# Patient Record
Sex: MALE | Race: WHITE | NOT HISPANIC OR LATINO
[De-identification: names, ages, dates, MRNs, and addresses within clinical notes are randomized per-mention and may not be internally consistent; named-entity substitution may affect disease eponyms.]

---

## 2019-06-27 ENCOUNTER — TRANSCRIPTION ENCOUNTER (OUTPATIENT)
Age: 64
End: 2019-06-27

## 2019-10-18 PROBLEM — Z00.00 ENCOUNTER FOR PREVENTIVE HEALTH EXAMINATION: Status: ACTIVE | Noted: 2019-10-18

## 2019-11-01 ENCOUNTER — APPOINTMENT (OUTPATIENT)
Dept: COLORECTAL SURGERY | Facility: CLINIC | Age: 64
End: 2019-11-01
Payer: MEDICARE

## 2019-11-01 VITALS
HEART RATE: 91 BPM | WEIGHT: 203.13 LBS | BODY MASS INDEX: 30.09 KG/M2 | SYSTOLIC BLOOD PRESSURE: 103 MMHG | TEMPERATURE: 98.3 F | DIASTOLIC BLOOD PRESSURE: 65 MMHG | HEIGHT: 69 IN

## 2019-11-01 DIAGNOSIS — D3A.090 BENIGN CARCINOID TUMOR OF THE BRONCHUS AND LUNG: ICD-10-CM

## 2019-11-01 DIAGNOSIS — K57.32 DIVERTICULITIS OF LARGE INTESTINE W/OUT PERFORATION OR ABSCESS W/OUT BLEEDING: ICD-10-CM

## 2019-11-01 DIAGNOSIS — Z86.39 PERSONAL HISTORY OF OTHER ENDOCRINE, NUTRITIONAL AND METABOLIC DISEASE: ICD-10-CM

## 2019-11-01 DIAGNOSIS — Z86.79 PERSONAL HISTORY OF OTHER DISEASES OF THE CIRCULATORY SYSTEM: ICD-10-CM

## 2019-11-01 DIAGNOSIS — J86.9 PYOTHORAX W/OUT FISTULA: ICD-10-CM

## 2019-11-01 DIAGNOSIS — Z80.7 FAMILY HISTORY OF OTHER MALIGNANT NEOPLASMS OF LYMPHOID, HEMATOPOIETIC AND RELATED TISSUES: ICD-10-CM

## 2019-11-01 PROCEDURE — 99215 OFFICE O/P EST HI 40 MIN: CPT

## 2019-11-01 RX ORDER — OLMESARTAN MEDOXOMIL / AMLODIPINE BESYLATE / HYDROCHLOROTHIAZIDE 10; 25; 40 MG/1; MG/1; MG/1
TABLET, FILM COATED ORAL
Refills: 0 | Status: ACTIVE | COMMUNITY

## 2019-11-01 RX ORDER — ATORVASTATIN CALCIUM 10 MG/1
10 TABLET, FILM COATED ORAL
Refills: 0 | Status: ACTIVE | COMMUNITY

## 2019-11-01 NOTE — PHYSICAL EXAM
[Abdomen Masses] : No abdominal masses [Abdomen Tenderness] : ~T No ~M abdominal tenderness [Tender] : nontender [Enlarged] : not enlarged [Exam Deferred] : exam was deferred [Excoriation] : no perianal excoriation [JVD] : no jugular venous distention  [Thyroid] : the thyroid was abnormal [Carotid Bruits] : no carotid bruits [Normal Breath Sounds] : Normal breath sounds [Normal Heart Sounds] : normal heart sounds [2+] : left 2+ [Alert] : alert [Oriented to Person] : oriented to person [Oriented to Place] : oriented to place [Oriented to Time] : oriented to time [Calm] : calm [de-identified] : nad

## 2019-11-01 NOTE — ASSESSMENT
[FreeTextEntry1] : More than 7-8 attacks (fever, Left sided pain).  CT documentation for both mid to distal descending colon and proximal to mid sigmoid attacks.  Had colonoscopy fairly recently.  NO complex attacks.\par Leaning toward having resection done.  \par Risks and benefits and alternative (observation) discussed.  Risk of infection (abscess, leak, wound infection, etc), bleeding (? need for transfusion), and sexual or bladder dysfunction discussed and understood).  \par Drawings, and all relevant web site packets given (L colectomy, diverituclitis, laparoscopy, etc).\par Medical clearance for his HTN requested.\par Will consider recommendations.

## 2019-11-01 NOTE — HISTORY OF PRESENT ILLNESS
[FreeTextEntry1] : 64 year old male with history of diverticulitis. He was seen by us in April of 2019  and told to have CT if more attacks occurred.  Has had  total of 7 or more diverticulits attacks . First attack was in 1991, no CT scan.  Was hospitalized in 2004 on IV antibiotics.    Most recent prior to September was in February 2019. \par In February he ws  treated for an attack and CT +. Colonoscopy was done in April by Ria. \par September he had an attack and was treated with oral antibiotics.  CT found non complicated mild diverticulitis.  Feels slight discomfort now.  BM's are regular with metamucil. \par \par CT from September seen (images) and showed clear mid descending colon fat stranding and swelling c/w diveritculitis.\par Also looked at 2 scans from before 2010. Both showed clearcut diverticulitis of mid to proximal sigmoid colon (same area on each early scan).

## 2019-11-10 ENCOUNTER — TRANSCRIPTION ENCOUNTER (OUTPATIENT)
Age: 64
End: 2019-11-10

## 2019-11-18 ENCOUNTER — TRANSCRIPTION ENCOUNTER (OUTPATIENT)
Age: 64
End: 2019-11-18

## 2019-12-23 RX ORDER — METRONIDAZOLE 500 MG/1
500 TABLET ORAL
Qty: 6 | Refills: 0 | Status: ACTIVE | COMMUNITY
Start: 2019-12-23 | End: 1900-01-01

## 2019-12-23 RX ORDER — POLYETHYLENE GLYCOL 3350, SODIUM CHLORIDE, SODIUM BICARBONATE AND POTASSIUM CHLORIDE WITH LEMON FLAVOR 420; 11.2; 5.72; 1.48 G/4L; G/4L; G/4L; G/4L
420 POWDER, FOR SOLUTION ORAL
Qty: 1 | Refills: 0 | Status: ACTIVE | COMMUNITY
Start: 2019-12-23 | End: 1900-01-01

## 2020-01-06 VITALS
WEIGHT: 199.3 LBS | OXYGEN SATURATION: 99 % | DIASTOLIC BLOOD PRESSURE: 68 MMHG | HEART RATE: 70 BPM | HEIGHT: 69.5 IN | SYSTOLIC BLOOD PRESSURE: 106 MMHG | TEMPERATURE: 98 F | RESPIRATION RATE: 16 BRPM

## 2020-01-07 ENCOUNTER — RESULT REVIEW (OUTPATIENT)
Age: 65
End: 2020-01-07

## 2020-01-07 ENCOUNTER — INPATIENT (INPATIENT)
Facility: HOSPITAL | Age: 65
LOS: 4 days | Discharge: HOME | DRG: 349 | End: 2020-01-12
Attending: COLON & RECTAL SURGERY | Admitting: COLON & RECTAL SURGERY
Payer: MEDICARE

## 2020-01-07 ENCOUNTER — APPOINTMENT (OUTPATIENT)
Dept: COLORECTAL SURGERY | Facility: HOSPITAL | Age: 65
End: 2020-01-07

## 2020-01-07 DIAGNOSIS — Z98.49 CATARACT EXTRACTION STATUS, UNSPECIFIED EYE: Chronic | ICD-10-CM

## 2020-01-07 DIAGNOSIS — Z90.49 ACQUIRED ABSENCE OF OTHER SPECIFIED PARTS OF DIGESTIVE TRACT: Chronic | ICD-10-CM

## 2020-01-07 DIAGNOSIS — Z98.890 OTHER SPECIFIED POSTPROCEDURAL STATES: Chronic | ICD-10-CM

## 2020-01-07 LAB
ANION GAP SERPL CALC-SCNC: 14 MMOL/L — SIGNIFICANT CHANGE UP (ref 5–17)
BLD GP AB SCN SERPL QL: NEGATIVE — SIGNIFICANT CHANGE UP
BUN SERPL-MCNC: 15 MG/DL — SIGNIFICANT CHANGE UP (ref 7–23)
CALCIUM SERPL-MCNC: 8.8 MG/DL — SIGNIFICANT CHANGE UP (ref 8.4–10.5)
CHLORIDE SERPL-SCNC: 101 MMOL/L — SIGNIFICANT CHANGE UP (ref 96–108)
CO2 SERPL-SCNC: 24 MMOL/L — SIGNIFICANT CHANGE UP (ref 22–31)
CREAT SERPL-MCNC: 1.16 MG/DL — SIGNIFICANT CHANGE UP (ref 0.5–1.3)
GLUCOSE BLDC GLUCOMTR-MCNC: 101 MG/DL — HIGH (ref 70–99)
GLUCOSE BLDC GLUCOMTR-MCNC: 136 MG/DL — HIGH (ref 70–99)
GLUCOSE SERPL-MCNC: 144 MG/DL — HIGH (ref 70–99)
HCT VFR BLD CALC: 36.6 % — LOW (ref 39–50)
HGB BLD-MCNC: 12.2 G/DL — LOW (ref 13–17)
MAGNESIUM SERPL-MCNC: 1.7 MG/DL — SIGNIFICANT CHANGE UP (ref 1.6–2.6)
MCHC RBC-ENTMCNC: 28.4 PG — SIGNIFICANT CHANGE UP (ref 27–34)
MCHC RBC-ENTMCNC: 33.3 GM/DL — SIGNIFICANT CHANGE UP (ref 32–36)
MCV RBC AUTO: 85.1 FL — SIGNIFICANT CHANGE UP (ref 80–100)
NRBC # BLD: 0 /100 WBCS — SIGNIFICANT CHANGE UP (ref 0–0)
PHOSPHATE SERPL-MCNC: 4.2 MG/DL — SIGNIFICANT CHANGE UP (ref 2.5–4.5)
PLATELET # BLD AUTO: 143 K/UL — LOW (ref 150–400)
POTASSIUM SERPL-MCNC: 4.1 MMOL/L — SIGNIFICANT CHANGE UP (ref 3.5–5.3)
POTASSIUM SERPL-SCNC: 4.1 MMOL/L — SIGNIFICANT CHANGE UP (ref 3.5–5.3)
RBC # BLD: 4.3 M/UL — SIGNIFICANT CHANGE UP (ref 4.2–5.8)
RBC # FLD: 15.7 % — HIGH (ref 10.3–14.5)
RH IG SCN BLD-IMP: POSITIVE — SIGNIFICANT CHANGE UP
SODIUM SERPL-SCNC: 139 MMOL/L — SIGNIFICANT CHANGE UP (ref 135–145)
WBC # BLD: 7.69 K/UL — SIGNIFICANT CHANGE UP (ref 3.8–10.5)
WBC # FLD AUTO: 7.69 K/UL — SIGNIFICANT CHANGE UP (ref 3.8–10.5)

## 2020-01-07 PROCEDURE — 88307 TISSUE EXAM BY PATHOLOGIST: CPT | Mod: 26

## 2020-01-07 PROCEDURE — 44204 LAPARO PARTIAL COLECTOMY: CPT

## 2020-01-07 PROCEDURE — 44213 LAP MOBIL SPLENIC FL ADD-ON: CPT

## 2020-01-07 RX ORDER — HEPARIN SODIUM 5000 [USP'U]/ML
5000 INJECTION INTRAVENOUS; SUBCUTANEOUS ONCE
Refills: 0 | Status: COMPLETED | OUTPATIENT
Start: 2020-01-07 | End: 2020-01-07

## 2020-01-07 RX ORDER — GABAPENTIN 400 MG/1
600 CAPSULE ORAL ONCE
Refills: 0 | Status: COMPLETED | OUTPATIENT
Start: 2020-01-07 | End: 2020-01-07

## 2020-01-07 RX ORDER — ACETAMINOPHEN 500 MG
1000 TABLET ORAL ONCE
Refills: 0 | Status: COMPLETED | OUTPATIENT
Start: 2020-01-08 | End: 2020-01-08

## 2020-01-07 RX ORDER — KETOROLAC TROMETHAMINE 30 MG/ML
15 SYRINGE (ML) INJECTION EVERY 6 HOURS
Refills: 0 | Status: DISCONTINUED | OUTPATIENT
Start: 2020-01-07 | End: 2020-01-08

## 2020-01-07 RX ORDER — HYDROMORPHONE HYDROCHLORIDE 2 MG/ML
0.5 INJECTION INTRAMUSCULAR; INTRAVENOUS; SUBCUTANEOUS EVERY 4 HOURS
Refills: 0 | Status: DISCONTINUED | OUTPATIENT
Start: 2020-01-07 | End: 2020-01-08

## 2020-01-07 RX ORDER — HEPARIN SODIUM 5000 [USP'U]/ML
5000 INJECTION INTRAVENOUS; SUBCUTANEOUS EVERY 8 HOURS
Refills: 0 | Status: DISCONTINUED | OUTPATIENT
Start: 2020-01-07 | End: 2020-01-12

## 2020-01-07 RX ORDER — ACETAMINOPHEN 500 MG
1000 TABLET ORAL ONCE
Refills: 0 | Status: COMPLETED | OUTPATIENT
Start: 2020-01-07 | End: 2020-01-07

## 2020-01-07 RX ORDER — ATORVASTATIN CALCIUM 80 MG/1
1 TABLET, FILM COATED ORAL
Qty: 0 | Refills: 0 | DISCHARGE

## 2020-01-07 RX ORDER — CHOLECALCIFEROL (VITAMIN D3) 125 MCG
1 CAPSULE ORAL
Qty: 0 | Refills: 0 | DISCHARGE

## 2020-01-07 RX ORDER — MAGNESIUM SULFATE 500 MG/ML
1 VIAL (ML) INJECTION ONCE
Refills: 0 | Status: COMPLETED | OUTPATIENT
Start: 2020-01-07 | End: 2020-01-07

## 2020-01-07 RX ORDER — SODIUM CHLORIDE 9 MG/ML
500 INJECTION INTRAMUSCULAR; INTRAVENOUS; SUBCUTANEOUS ONCE
Refills: 0 | Status: COMPLETED | OUTPATIENT
Start: 2020-01-07 | End: 2020-01-07

## 2020-01-07 RX ORDER — SODIUM CHLORIDE 9 MG/ML
1000 INJECTION, SOLUTION INTRAVENOUS
Refills: 0 | Status: DISCONTINUED | OUTPATIENT
Start: 2020-01-07 | End: 2020-01-08

## 2020-01-07 RX ORDER — ONDANSETRON 8 MG/1
4 TABLET, FILM COATED ORAL EVERY 6 HOURS
Refills: 0 | Status: DISCONTINUED | OUTPATIENT
Start: 2020-01-07 | End: 2020-01-12

## 2020-01-07 RX ORDER — ASPIRIN/CALCIUM CARB/MAGNESIUM 324 MG
1 TABLET ORAL
Qty: 0 | Refills: 0 | DISCHARGE

## 2020-01-07 RX ORDER — OLMESARTAN MEDOXOMIL / AMLODIPINE BESYLATE / HYDROCHLOROTHIAZIDE 40; 10; 25 MG/1; MG/1; MG/1
1 TABLET, FILM COATED ORAL
Qty: 0 | Refills: 0 | DISCHARGE

## 2020-01-07 RX ADMIN — SODIUM CHLORIDE 125 MILLILITER(S): 9 INJECTION, SOLUTION INTRAVENOUS at 15:49

## 2020-01-07 RX ADMIN — Medication 1000 MILLIGRAM(S): at 16:45

## 2020-01-07 RX ADMIN — Medication 1 DROP(S): at 22:42

## 2020-01-07 RX ADMIN — Medication 400 MILLIGRAM(S): at 22:00

## 2020-01-07 RX ADMIN — HYDROMORPHONE HYDROCHLORIDE 0.5 MILLIGRAM(S): 2 INJECTION INTRAMUSCULAR; INTRAVENOUS; SUBCUTANEOUS at 16:49

## 2020-01-07 RX ADMIN — SODIUM CHLORIDE 1000 MILLILITER(S): 9 INJECTION INTRAMUSCULAR; INTRAVENOUS; SUBCUTANEOUS at 23:15

## 2020-01-07 RX ADMIN — HYDROMORPHONE HYDROCHLORIDE 0.5 MILLIGRAM(S): 2 INJECTION INTRAMUSCULAR; INTRAVENOUS; SUBCUTANEOUS at 16:40

## 2020-01-07 RX ADMIN — Medication 15 MILLIGRAM(S): at 22:00

## 2020-01-07 RX ADMIN — HEPARIN SODIUM 5000 UNIT(S): 5000 INJECTION INTRAVENOUS; SUBCUTANEOUS at 08:50

## 2020-01-07 RX ADMIN — GABAPENTIN 600 MILLIGRAM(S): 400 CAPSULE ORAL at 08:50

## 2020-01-07 RX ADMIN — Medication 1000 MILLIGRAM(S): at 08:50

## 2020-01-07 RX ADMIN — Medication 15 MILLIGRAM(S): at 22:29

## 2020-01-07 RX ADMIN — HEPARIN SODIUM 5000 UNIT(S): 5000 INJECTION INTRAVENOUS; SUBCUTANEOUS at 22:00

## 2020-01-07 RX ADMIN — SODIUM CHLORIDE 125 MILLILITER(S): 9 INJECTION, SOLUTION INTRAVENOUS at 22:01

## 2020-01-07 RX ADMIN — Medication 1000 MILLIGRAM(S): at 22:29

## 2020-01-07 RX ADMIN — Medication 400 MILLIGRAM(S): at 16:03

## 2020-01-07 RX ADMIN — Medication 100 GRAM(S): at 22:42

## 2020-01-07 RX ADMIN — Medication 15 MILLIGRAM(S): at 16:05

## 2020-01-07 RX ADMIN — Medication 15 MILLIGRAM(S): at 15:59

## 2020-01-07 NOTE — BRIEF OPERATIVE NOTE - OPERATION/FINDINGS
Extent of disease appeared to be mid-descending colon to proximal sigmoid, with sparing of the mid- and distal sigmoid. The affected colon was mobilized as was the splenic flexure. The left ureter was identified and preserved. A stapled ,extracorporeal resection was performed and a side to side colon anastomosis was created.

## 2020-01-07 NOTE — H&P ADULT - NSICDXPASTSURGICALHX_GEN_ALL_CORE_FT
PAST SURGICAL HISTORY:  H/O inguinal hernia repair     H/O umbilical hernia repair     History of appendectomy     History of appendectomy     History of cataract surgery bilateral    History of lung surgery right, empyema removed, 2015    History of lung surgery left upper lobe, 2004

## 2020-01-07 NOTE — BRIEF OPERATIVE NOTE - NSICDXBRIEFPROCEDURE_GEN_ALL_CORE_FT
PROCEDURES:  Mobilization, splenic flexure 07-Jan-2020 16:01:31  Lisa Ortez  Sigmoidectomy, laparoscopic, with laparotomy if indicated 07-Jan-2020 16:01:18  Lisa Ortez

## 2020-01-07 NOTE — H&P ADULT - NSICDXPASTMEDICALHX_GEN_ALL_CORE_FT
PAST MEDICAL HISTORY:  Acid reflux     Carcinoid tumor left lung    Diverticulitis     HLD (hyperlipidemia)     HTN (hypertension)

## 2020-01-07 NOTE — H&P ADULT - HISTORY OF PRESENT ILLNESS
Patient is a 64 year old male with long history of numerous episodes of diverticulitis since 1991, last in September 2019. Based on imaging, disease was noted in the descending and proximal sigmoid colon. He now presents electively for laparoscopic sigmoidectomy.  He also has KEE on CPAP, and history of carcinoid tumor resected from left lung. No complaints at present.

## 2020-01-07 NOTE — H&P ADULT - ASSESSMENT
64M with long history of numerous episodes of diverticulitis presents electively for laparoscopic sigmoidectomy.   - Admit to surgery, telemetry bed  - OR for above procedure  - CPAP post-op  - ERAS/Colon bundle

## 2020-01-07 NOTE — PROGRESS NOTE ADULT - SUBJECTIVE AND OBJECTIVE BOX
POST-OPERATIVE NOTE    Procedure: Laparoscopic Sigmoidectomy    Diagnosis/Indication: Diverticulitis     Surgeon: Dr. Alonzo    S: Pt has no complaints. Denies CP, SOB, FLOREZ, calf tenderness. Pain controlled with medication. Denies nausea, vomiting.    O:  T(C): 37.2 (01-07-20 @ 18:10), Max: 37.2 (01-07-20 @ 18:10)  T(F): 99 (01-07-20 @ 18:10), Max: 99 (01-07-20 @ 18:10)  HR: 90 (01-07-20 @ 20:20) (83 - 90)  BP: 135/66 (01-07-20 @ 20:20) (125/68 - 135/66)  RR: 16 (01-07-20 @ 20:20) (16 - 16)  SpO2: 95% (01-07-20 @ 20:20) (95% - 100%)  Wt(kg): --                        12.2   7.69  )-----------( 143      ( 07 Jan 2020 20:24 )             36.6     01-07    139  |  101  |  15  ----------------------------<  144<H>  4.1   |  24  |  1.16    Ca    8.8      07 Jan 2020 20:24  Phos  4.2     01-07  Mg     1.7     01-07    Gen: NAD, resting comfortably in bed  C/V: NSR  Pulm: Nonlabored breathing, no respiratory distress  Abd: soft, NT/ND, incision areas are clean, dry and intact  Extrem: WWP, no calf edema or tenderness, SCDs in place    A/P: 64y Male s/p above procedure  Diet: NPO  IVF: LR @ 125cc/hr  Pain/nausea control  SQH/SCDs/OOBA/IS  Dispo pending pain control, PO tolerance, clinical improvement

## 2020-01-08 LAB
ANION GAP SERPL CALC-SCNC: 12 MMOL/L — SIGNIFICANT CHANGE UP (ref 5–17)
BUN SERPL-MCNC: 16 MG/DL — SIGNIFICANT CHANGE UP (ref 7–23)
CALCIUM SERPL-MCNC: 8.3 MG/DL — LOW (ref 8.4–10.5)
CHLORIDE SERPL-SCNC: 105 MMOL/L — SIGNIFICANT CHANGE UP (ref 96–108)
CO2 SERPL-SCNC: 22 MMOL/L — SIGNIFICANT CHANGE UP (ref 22–31)
CREAT SERPL-MCNC: 1.17 MG/DL — SIGNIFICANT CHANGE UP (ref 0.5–1.3)
GLUCOSE SERPL-MCNC: 123 MG/DL — HIGH (ref 70–99)
HCT VFR BLD CALC: 32.2 % — LOW (ref 39–50)
HCV AB S/CO SERPL IA: 0.08 S/CO — SIGNIFICANT CHANGE UP
HCV AB SERPL-IMP: SIGNIFICANT CHANGE UP
HGB BLD-MCNC: 10.7 G/DL — LOW (ref 13–17)
MAGNESIUM SERPL-MCNC: 2 MG/DL — SIGNIFICANT CHANGE UP (ref 1.6–2.6)
MCHC RBC-ENTMCNC: 28.2 PG — SIGNIFICANT CHANGE UP (ref 27–34)
MCHC RBC-ENTMCNC: 33.2 GM/DL — SIGNIFICANT CHANGE UP (ref 32–36)
MCV RBC AUTO: 84.7 FL — SIGNIFICANT CHANGE UP (ref 80–100)
NRBC # BLD: 0 /100 WBCS — SIGNIFICANT CHANGE UP (ref 0–0)
PHOSPHATE SERPL-MCNC: 4.3 MG/DL — SIGNIFICANT CHANGE UP (ref 2.5–4.5)
PLATELET # BLD AUTO: 132 K/UL — LOW (ref 150–400)
POTASSIUM SERPL-MCNC: 4.2 MMOL/L — SIGNIFICANT CHANGE UP (ref 3.5–5.3)
POTASSIUM SERPL-SCNC: 4.2 MMOL/L — SIGNIFICANT CHANGE UP (ref 3.5–5.3)
RBC # BLD: 3.8 M/UL — LOW (ref 4.2–5.8)
RBC # FLD: 15.5 % — HIGH (ref 10.3–14.5)
SODIUM SERPL-SCNC: 139 MMOL/L — SIGNIFICANT CHANGE UP (ref 135–145)
WBC # BLD: 6.39 K/UL — SIGNIFICANT CHANGE UP (ref 3.8–10.5)
WBC # FLD AUTO: 6.39 K/UL — SIGNIFICANT CHANGE UP (ref 3.8–10.5)

## 2020-01-08 RX ORDER — ACETAMINOPHEN 500 MG
975 TABLET ORAL EVERY 6 HOURS
Refills: 0 | Status: COMPLETED | OUTPATIENT
Start: 2020-01-08 | End: 2020-01-10

## 2020-01-08 RX ORDER — HYDROMORPHONE HYDROCHLORIDE 2 MG/ML
0.5 INJECTION INTRAMUSCULAR; INTRAVENOUS; SUBCUTANEOUS EVERY 4 HOURS
Refills: 0 | Status: DISCONTINUED | OUTPATIENT
Start: 2020-01-08 | End: 2020-01-09

## 2020-01-08 RX ORDER — SODIUM CHLORIDE 9 MG/ML
500 INJECTION, SOLUTION INTRAVENOUS ONCE
Refills: 0 | Status: COMPLETED | OUTPATIENT
Start: 2020-01-08 | End: 2020-01-08

## 2020-01-08 RX ORDER — DEXTROSE MONOHYDRATE, SODIUM CHLORIDE, AND POTASSIUM CHLORIDE 50; .745; 4.5 G/1000ML; G/1000ML; G/1000ML
1000 INJECTION, SOLUTION INTRAVENOUS
Refills: 0 | Status: DISCONTINUED | OUTPATIENT
Start: 2020-01-08 | End: 2020-01-10

## 2020-01-08 RX ADMIN — Medication 975 MILLIGRAM(S): at 21:31

## 2020-01-08 RX ADMIN — HYDROMORPHONE HYDROCHLORIDE 0.5 MILLIGRAM(S): 2 INJECTION INTRAMUSCULAR; INTRAVENOUS; SUBCUTANEOUS at 18:27

## 2020-01-08 RX ADMIN — HEPARIN SODIUM 5000 UNIT(S): 5000 INJECTION INTRAVENOUS; SUBCUTANEOUS at 05:36

## 2020-01-08 RX ADMIN — HEPARIN SODIUM 5000 UNIT(S): 5000 INJECTION INTRAVENOUS; SUBCUTANEOUS at 21:31

## 2020-01-08 RX ADMIN — HYDROMORPHONE HYDROCHLORIDE 0.5 MILLIGRAM(S): 2 INJECTION INTRAMUSCULAR; INTRAVENOUS; SUBCUTANEOUS at 18:40

## 2020-01-08 RX ADMIN — Medication 15 MILLIGRAM(S): at 10:26

## 2020-01-08 RX ADMIN — Medication 400 MILLIGRAM(S): at 04:00

## 2020-01-08 RX ADMIN — Medication 15 MILLIGRAM(S): at 15:39

## 2020-01-08 RX ADMIN — Medication 1 DROP(S): at 05:36

## 2020-01-08 RX ADMIN — Medication 1 DROP(S): at 13:37

## 2020-01-08 RX ADMIN — SODIUM CHLORIDE 1000 MILLILITER(S): 9 INJECTION, SOLUTION INTRAVENOUS at 05:35

## 2020-01-08 RX ADMIN — HEPARIN SODIUM 5000 UNIT(S): 5000 INJECTION INTRAVENOUS; SUBCUTANEOUS at 13:37

## 2020-01-08 RX ADMIN — Medication 15 MILLIGRAM(S): at 11:12

## 2020-01-08 RX ADMIN — DEXTROSE MONOHYDRATE, SODIUM CHLORIDE, AND POTASSIUM CHLORIDE 100 MILLILITER(S): 50; .745; 4.5 INJECTION, SOLUTION INTRAVENOUS at 18:25

## 2020-01-08 RX ADMIN — Medication 15 MILLIGRAM(S): at 02:56

## 2020-01-08 RX ADMIN — Medication 1000 MILLIGRAM(S): at 05:00

## 2020-01-08 RX ADMIN — Medication 15 MILLIGRAM(S): at 15:40

## 2020-01-08 RX ADMIN — Medication 15 MILLIGRAM(S): at 03:34

## 2020-01-08 RX ADMIN — Medication 975 MILLIGRAM(S): at 22:10

## 2020-01-08 NOTE — PROGRESS NOTE ADULT - ASSESSMENT
A/P  Doing ok.  Rising creatinine noted.  d/c toradol.  Add 1000 mg tylenol po  Dilaudid for breakthrough pain.  D/c radha in am.  follow UO.  clears until has BM.  Ambulate more.

## 2020-01-08 NOTE — PROGRESS NOTE ADULT - ASSESSMENT
64M with long history of numerous episodes of diverticulitis presents electively for laparoscopic sigmoidectomy.     - NPO/IVF  - Pain/nausea control  - Post op labs  - Devries  - CPAP   - SCDs/SQH  - AM labs

## 2020-01-08 NOTE — PROGRESS NOTE ADULT - SUBJECTIVE AND OBJECTIVE BOX
STATUS POST:  POD 1 laparoscopic sigmoidectomy    SUBJECTIVE: Patient seen and examined bedside by chief resident and surgical team.  Patient received 1L bolus for low and concentrated urine. Improved urine o/p. Patient states he is passing urine and he is ambulating well.      heparin  Injectable 5000 Unit(s) SubCutaneous every 8 hours    Vital Signs Last 24 Hrs  T(C): 37.3 (08 Jan 2020 05:17), Max: 37.6 (07 Jan 2020 22:04)  T(F): 99.2 (08 Jan 2020 05:17), Max: 99.6 (07 Jan 2020 22:04)  HR: 68 (08 Jan 2020 04:04) (68 - 90)  BP: 104/59 (08 Jan 2020 04:04) (104/59 - 147/79)  BP(mean): 72 (08 Jan 2020 04:04) (72 - 106)  RR: 16 (08 Jan 2020 04:04) (14 - 18)  SpO2: 97% (08 Jan 2020 04:04) (95% - 100%)  I&O's Detail    07 Jan 2020 07:01  -  08 Jan 2020 07:00  --------------------------------------------------------  IN:    IV PiggyBack: 800 mL    lactated ringers.: 2500 mL  Total IN: 3300 mL    OUT:    Indwelling Catheter - Urethral: 775 mL  Total OUT: 775 mL    Total NET: 2525 mL    General: NAD, resting comfortably in bed  C/V: NSR  Pulm: Nonlabored breathing, no respiratory distress  Abd: soft, NT/ND, incision areas are clean, dry and intact  Extrem: WWP, no edema, SCDs in place    LABS:                        10.7   6.39  )-----------( 132      ( 08 Jan 2020 05:53 )             32.2     01-08    139  |  105  |  16  ----------------------------<  123<H>  4.2   |  22  |  1.17    Ca    8.3<L>      08 Jan 2020 05:54  Phos  4.3     01-08  Mg     2.0     01-08

## 2020-01-08 NOTE — PROGRESS NOTE ADULT - SUBJECTIVE AND OBJECTIVE BOX
POD 1  Passed flatus.  No BM  No N/V.  Took 1 dose dilaudid for pain.  Getting toradol and tylenol as well.  Devries remains.  Ambulated several times.  Tolerated clear liquids.    Vital Signs Last 24 Hrs  T(C): 36.9 (08 Jan 2020 15:24), Max: 37.6 (07 Jan 2020 22:04)  T(F): 98.4 (08 Jan 2020 15:24), Max: 99.6 (07 Jan 2020 22:04)  HR: 70 (08 Jan 2020 15:24) (68 - 90)  BP: 120/77 (08 Jan 2020 15:24) (104/59 - 135/66)  BP(mean): 80 (08 Jan 2020 09:13) (72 - 93)  RR: 18 (08 Jan 2020 15:24) (16 - 18)  SpO2: 96% (08 Jan 2020 15:24) (95% - 97%)    lungs clear   cor S1S2  abd: softly distended and obese, incision intact, BS+,   ext: without calf tenderness    UO   adequate                          10.7   6.39  )-----------( 132      ( 08 Jan 2020 05:53 )             32.2   01-08    139  |  105  |  16  ----------------------------<  123<H>  4.2   |  22  |  1.17    Ca    8.3<L>      08 Jan 2020 05:54  Phos  4.3     01-08  Mg     2.0     01-08

## 2020-01-09 ENCOUNTER — TRANSCRIPTION ENCOUNTER (OUTPATIENT)
Age: 65
End: 2020-01-09

## 2020-01-09 LAB
ANION GAP SERPL CALC-SCNC: 9 MMOL/L — SIGNIFICANT CHANGE UP (ref 5–17)
BUN SERPL-MCNC: 14 MG/DL — SIGNIFICANT CHANGE UP (ref 7–23)
CALCIUM SERPL-MCNC: 8.5 MG/DL — SIGNIFICANT CHANGE UP (ref 8.4–10.5)
CHLORIDE SERPL-SCNC: 107 MMOL/L — SIGNIFICANT CHANGE UP (ref 96–108)
CO2 SERPL-SCNC: 26 MMOL/L — SIGNIFICANT CHANGE UP (ref 22–31)
CREAT SERPL-MCNC: 1.16 MG/DL — SIGNIFICANT CHANGE UP (ref 0.5–1.3)
GLUCOSE SERPL-MCNC: 112 MG/DL — HIGH (ref 70–99)
HCT VFR BLD CALC: 31.9 % — LOW (ref 39–50)
HGB BLD-MCNC: 10 G/DL — LOW (ref 13–17)
MAGNESIUM SERPL-MCNC: 1.9 MG/DL — SIGNIFICANT CHANGE UP (ref 1.6–2.6)
MCHC RBC-ENTMCNC: 27.9 PG — SIGNIFICANT CHANGE UP (ref 27–34)
MCHC RBC-ENTMCNC: 31.3 GM/DL — LOW (ref 32–36)
MCV RBC AUTO: 89.1 FL — SIGNIFICANT CHANGE UP (ref 80–100)
NRBC # BLD: 0 /100 WBCS — SIGNIFICANT CHANGE UP (ref 0–0)
PHOSPHATE SERPL-MCNC: 2.5 MG/DL — SIGNIFICANT CHANGE UP (ref 2.5–4.5)
PLATELET # BLD AUTO: 108 K/UL — LOW (ref 150–400)
POTASSIUM SERPL-MCNC: 4.4 MMOL/L — SIGNIFICANT CHANGE UP (ref 3.5–5.3)
POTASSIUM SERPL-SCNC: 4.4 MMOL/L — SIGNIFICANT CHANGE UP (ref 3.5–5.3)
RBC # BLD: 3.58 M/UL — LOW (ref 4.2–5.8)
RBC # FLD: 15.8 % — HIGH (ref 10.3–14.5)
SODIUM SERPL-SCNC: 142 MMOL/L — SIGNIFICANT CHANGE UP (ref 135–145)
WBC # BLD: 4.26 K/UL — SIGNIFICANT CHANGE UP (ref 3.8–10.5)
WBC # FLD AUTO: 4.26 K/UL — SIGNIFICANT CHANGE UP (ref 3.8–10.5)

## 2020-01-09 PROCEDURE — 74019 RADEX ABDOMEN 2 VIEWS: CPT | Mod: 26

## 2020-01-09 RX ORDER — DIPHENHYDRAMINE HCL 50 MG
25 CAPSULE ORAL ONCE
Refills: 0 | Status: COMPLETED | OUTPATIENT
Start: 2020-01-09 | End: 2020-01-09

## 2020-01-09 RX ORDER — OXYCODONE HYDROCHLORIDE 5 MG/1
5 TABLET ORAL EVERY 6 HOURS
Refills: 0 | Status: DISCONTINUED | OUTPATIENT
Start: 2020-01-09 | End: 2020-01-12

## 2020-01-09 RX ORDER — MAGNESIUM SULFATE 500 MG/ML
1 VIAL (ML) INJECTION ONCE
Refills: 0 | Status: COMPLETED | OUTPATIENT
Start: 2020-01-09 | End: 2020-01-09

## 2020-01-09 RX ADMIN — Medication 100 GRAM(S): at 10:13

## 2020-01-09 RX ADMIN — Medication 975 MILLIGRAM(S): at 21:01

## 2020-01-09 RX ADMIN — Medication 975 MILLIGRAM(S): at 16:00

## 2020-01-09 RX ADMIN — Medication 975 MILLIGRAM(S): at 22:00

## 2020-01-09 RX ADMIN — Medication 25 MILLIGRAM(S): at 21:02

## 2020-01-09 RX ADMIN — Medication 975 MILLIGRAM(S): at 15:11

## 2020-01-09 RX ADMIN — DEXTROSE MONOHYDRATE, SODIUM CHLORIDE, AND POTASSIUM CHLORIDE 100 MILLILITER(S): 50; .745; 4.5 INJECTION, SOLUTION INTRAVENOUS at 16:19

## 2020-01-09 RX ADMIN — HEPARIN SODIUM 5000 UNIT(S): 5000 INJECTION INTRAVENOUS; SUBCUTANEOUS at 13:31

## 2020-01-09 RX ADMIN — Medication 975 MILLIGRAM(S): at 10:12

## 2020-01-09 RX ADMIN — Medication 975 MILLIGRAM(S): at 04:00

## 2020-01-09 RX ADMIN — HEPARIN SODIUM 5000 UNIT(S): 5000 INJECTION INTRAVENOUS; SUBCUTANEOUS at 21:01

## 2020-01-09 RX ADMIN — Medication 975 MILLIGRAM(S): at 03:08

## 2020-01-09 RX ADMIN — HYDROMORPHONE HYDROCHLORIDE 0.5 MILLIGRAM(S): 2 INJECTION INTRAMUSCULAR; INTRAVENOUS; SUBCUTANEOUS at 16:15

## 2020-01-09 RX ADMIN — HYDROMORPHONE HYDROCHLORIDE 0.5 MILLIGRAM(S): 2 INJECTION INTRAMUSCULAR; INTRAVENOUS; SUBCUTANEOUS at 16:30

## 2020-01-09 RX ADMIN — Medication 975 MILLIGRAM(S): at 09:12

## 2020-01-09 RX ADMIN — HEPARIN SODIUM 5000 UNIT(S): 5000 INJECTION INTRAVENOUS; SUBCUTANEOUS at 05:05

## 2020-01-09 NOTE — DISCHARGE NOTE PROVIDER - NSDCMRMEDTOKEN_GEN_ALL_CORE_FT
aspirin 81 mg oral delayed release tablet: 1 tab(s) orally once a day  atorvastatin 10 mg oral tablet: 1 tab(s) orally once a day  sildenafil 20 mg oral tablet: 1 tab(s) orally once a day, As Needed  Tribenzor 40 mg-5 mg-12.5 mg oral tablet: 1 tab(s) orally once a day  Vitamin D3 1000 intl units (25 mcg) oral tablet: 1 tab(s) orally once a day aspirin 81 mg oral delayed release tablet: 1 tab(s) orally once a day  atorvastatin 10 mg oral tablet: 1 tab(s) orally once a day  Colace 100 mg oral capsule: 1 cap(s) orally 2 times a day, As Needed -for constipation MDD:2   sildenafil 20 mg oral tablet: 1 tab(s) orally once a day, As Needed  Tribenzor 40 mg-5 mg-12.5 mg oral tablet: 1 tab(s) orally once a day  Vitamin D3 1000 intl units (25 mcg) oral tablet: 1 tab(s) orally once a day

## 2020-01-09 NOTE — PROGRESS NOTE ADULT - ASSESSMENT
64M with long history of numerous episodes of diverticulitis now s/p laparoscopic sigmoidectomy 1/7    - Adv to reg diet/IVF  - Pain/nausea control  - SCDs/SQH  - AM labs

## 2020-01-09 NOTE — DISCHARGE NOTE PROVIDER - HOSPITAL COURSE
Patient is a 64 year old male with long history of numerous episodes of diverticulitis since 1991, last in September 2019. Based on imaging, disease was noted in the descending and proximal sigmoid colon. He now presents electively for laparoscopic sigmoidectomy.  He also has KEE on CPAP, and history of carcinoid tumor resected from left lung. No complaints at present.         The patient was admitted to the general surgery service and on 1/7 underwent a laparoscopic sigmoidectomy that was uncomplicated. His postoperative course was unremarkable with advancement of diet, passing trial of void, and pain control. On day of discharge patient was stable to be d/c'd home. Patient is a 64 year old male with long history of numerous episodes of diverticulitis since 1991, last in September 2019. Based on imaging, disease was noted in the descending and proximal sigmoid colon. He now presents electively for laparoscopic sigmoidectomy.  He also has KEE on CPAP, and history of carcinoid tumor resected from left lung. No complaints at present.         The patient was admitted to the general surgery service and on 1/7 underwent a laparoscopic sigmoidectomy that was uncomplicated. His postoperative course was unremarkable with advancement of diet, passing trial of void, and pain control, voiding adequately, with return of bowel function. On day of discharge patient was stable to be d/c'd home.

## 2020-01-09 NOTE — PROGRESS NOTE ADULT - SUBJECTIVE AND OBJECTIVE BOX
24 hr events:  O/N: +Flatus/-BM. AFVSS. Jah CLD. Per Dr. Alonzo, gave standing tylenol x 2 d. d/c'd toradol for rising Cr (1.17).  1/8: adv to CLD, tolerating, UOP good. ambulating, +F/-BM. stepped down to regional.     SUBJECTIVE:  Pt seen and examined by chief resident. Pt is doing well, resting comfortably on bed. Pain controlled. Diet tolerated. Ambulating out of bed. +F/-BM. No nausea or vomiting. No complaints at this time.    Vital Signs Last 24 Hrs  T(C): 36.8 (09 Jan 2020 07:57), Max: 37.3 (08 Jan 2020 14:35)  T(F): 98.2 (09 Jan 2020 07:57), Max: 99.2 (08 Jan 2020 14:35)  HR: 71 (09 Jan 2020 07:57) (70 - 85)  BP: 141/83 (09 Jan 2020 07:57) (111/61 - 141/83)  BP(mean): 80 (08 Jan 2020 09:13) (80 - 80)  RR: 16 (09 Jan 2020 07:57) (16 - 18)  SpO2: 96% (09 Jan 2020 07:57) (96% - 97%)    Physical Exam:  General: NAD  Pulmonary: Nonlabored breathing, no respiratory distress  Cardiovascular: NSR  Abdominal: soft, mild distention, nontender, dressing over midline incision clean dry and intact.     Lines/drains/tubes:    I&O's Summary    08 Jan 2020 07:01  -  09 Jan 2020 07:00  --------------------------------------------------------  IN: 1450 mL / OUT: 1800 mL / NET: -350 mL        LABS:                        10.7   6.39  )-----------( 132      ( 08 Jan 2020 05:53 )             32.2     01-08    139  |  105  |  16  ----------------------------<  123<H>  4.2   |  22  |  1.17    Ca    8.3<L>      08 Jan 2020 05:54  Phos  4.3     01-08  Mg     2.0     01-08

## 2020-01-09 NOTE — DISCHARGE NOTE PROVIDER - NSDCFUADDINST_GEN_ALL_CORE_FT
Record urine volume each day, should be greater than 750cc/day. Please follow up with Dr. Alonzo next week. Call the office if you experience increasing pain, nausea, vomiting, swelling, redness, or leakage from stoma site, temperature >101.4F. Record urine volume each day, should be greater than 750cc/day. Please follow up with Dr. Alonzo next week. Call the office at [phone] to make an appointment. if you experience increasing pain, nausea, vomiting, swelling, redness, or leakage from stoma site, temperature >101.4F, please notify the surgical office.

## 2020-01-09 NOTE — DISCHARGE NOTE PROVIDER - INSTRUCTIONS
Low fiber diet Low fiber diet    Please continue a low fiber diet. Foods that are generally allowed on a low-fiber diet include:  White bread without nuts and seeds  White rice, plain white pasta, and crackers  Refined hot cereals, such as Cream of Wheat, or cold cereals with less than 1 gram of fiber per serving  Pancakes or waffles made from white refined flour  Most canned or well-cooked vegetables and fruits without skins or seeds  Fruit and vegetable juice with little or no pulp, fruit-flavored drinks, and flavored bruner  Tender meat, poultry, fish, eggs and tofu  Milk and foods made from milk — such as yogurt, pudding, ice cream, cheeses and sour cream — if tolerated  Butter, margarine, oils and salad dressings without seeds

## 2020-01-09 NOTE — DISCHARGE NOTE PROVIDER - CARE PROVIDER_API CALL
Kevin Alonzo)  ColonRectal Surgery  [address], [address]  [place], [place] [address]  Phone: [phone]  Follow Up Time:

## 2020-01-09 NOTE — PROGRESS NOTE ADULT - ASSESSMENT
A/P  Ileus with moderate soft distension.  Avoiding toradol due to elevated creatinine.  That has led to occasional dilaudid use which is not helping.  Doesn't need NG, thus far.  Will follow.  Minimize p/o clear fluids for now.  Ambulate  continue IV hydration, moderate rate.  Follow exam.

## 2020-01-09 NOTE — DISCHARGE NOTE PROVIDER - NSDCACTIVITY_GEN_ALL_CORE
Showering allowed/No heavy lifting/straining Walking - Outdoors allowed/No heavy lifting/straining/Stairs allowed/Walking - Indoors allowed/Showering allowed

## 2020-01-09 NOTE — PROGRESS NOTE ADULT - SUBJECTIVE AND OBJECTIVE BOX
POD 2  Passed small flatus this AM but none since.  No BM  Some mild nausea but no V.  Ambulated in hallway.  Getting po tylenol and prn dilaudid for pain.  Recently this PM took dilaudid  Had been taking clear liquids but has slowed down due to distension and N.  garcia out and has voided well since    Vital Signs Last 24 Hrs  T(C): 36.7 (09 Jan 2020 16:36), Max: 36.8 (09 Jan 2020 05:27)  T(F): 98 (09 Jan 2020 16:36), Max: 98.2 (09 Jan 2020 05:27)  HR: 68 (09 Jan 2020 16:36) (68 - 85)  BP: 130/78 (09 Jan 2020 16:36) (126/78 - 141/83)  BP(mean): --  RR: 18 (09 Jan 2020 16:36) (16 - 18)  SpO2: 96% (09 Jan 2020 16:36) (96% - 97%)  lungs: clear  cor: S1S2  abd: moderately distended but soft and non tender, BS very active normal pitched, incision intact, binder in place  ext: without calf tenderness     ml/last shift (last void volume was 250 ml)                          10.0   4.26  )-----------( 108      ( 09 Jan 2020 08:20 )             31.9   01-09    142  |  107  |  14  ----------------------------<  112<H>  4.4   |  26  |  1.16    Ca    8.5      09 Jan 2020 08:20  Phos  2.5     01-09  Mg     1.9     01-09

## 2020-01-09 NOTE — DISCHARGE NOTE PROVIDER - NSDCCPCAREPLAN_GEN_ALL_CORE_FT
PRINCIPAL DISCHARGE DIAGNOSIS  Diagnosis: Diverticulitis of colon  Assessment and Plan of Treatment: General Discharge Instructions:  Please resume all regular home medications unless specifically advised not to take a particular medication. Also, please take any new medications as prescribed.  Please get plenty of rest, continue to ambulate several times per day, and drink adequate amounts of fluids. Avoid lifting weights greater than 5-10 lbs until you follow-up with your surgeon, who will instruct you further regarding activity restrictions.  Avoid driving or operating heavy machinery while taking pain medications.  Please follow-up with your surgeon and Primary Care Provider (PCP) as advised.  Incision Care:  *Please call your doctor or nurse practitioner if you have increased pain, swelling, redness, or drainage from the incision site.  *Avoid swimming and baths until your follow-up appointment.  *You may shower, and wash surgical incisions with a mild soap and warm water. Gently pat the area dry.  *If you have staples, they will be removed at your follow-up appointment.  *If you have steri-strips, they will fall off on their own. Please remove any remaining strips 7-10 days after surgery.

## 2020-01-09 NOTE — DISCHARGE NOTE PROVIDER - NSDCCPTREATMENT_GEN_ALL_CORE_FT
PRINCIPAL PROCEDURE  Procedure: Sigmoidectomy, laparoscopic, with laparotomy if indicated  Findings and Treatment:       SECONDARY PROCEDURE  Procedure: Mobilization, splenic flexure  Findings and Treatment:

## 2020-01-10 LAB
ANION GAP SERPL CALC-SCNC: 10 MMOL/L — SIGNIFICANT CHANGE UP (ref 5–17)
BUN SERPL-MCNC: 9 MG/DL — SIGNIFICANT CHANGE UP (ref 7–23)
CALCIUM SERPL-MCNC: 8.4 MG/DL — SIGNIFICANT CHANGE UP (ref 8.4–10.5)
CHLORIDE SERPL-SCNC: 106 MMOL/L — SIGNIFICANT CHANGE UP (ref 96–108)
CO2 SERPL-SCNC: 24 MMOL/L — SIGNIFICANT CHANGE UP (ref 22–31)
CREAT SERPL-MCNC: 1.07 MG/DL — SIGNIFICANT CHANGE UP (ref 0.5–1.3)
GLUCOSE SERPL-MCNC: 108 MG/DL — HIGH (ref 70–99)
HCT VFR BLD CALC: 32.2 % — LOW (ref 39–50)
HGB BLD-MCNC: 10.3 G/DL — LOW (ref 13–17)
MAGNESIUM SERPL-MCNC: 2 MG/DL — SIGNIFICANT CHANGE UP (ref 1.6–2.6)
MCHC RBC-ENTMCNC: 28.1 PG — SIGNIFICANT CHANGE UP (ref 27–34)
MCHC RBC-ENTMCNC: 32 GM/DL — SIGNIFICANT CHANGE UP (ref 32–36)
MCV RBC AUTO: 87.7 FL — SIGNIFICANT CHANGE UP (ref 80–100)
NRBC # BLD: 0 /100 WBCS — SIGNIFICANT CHANGE UP (ref 0–0)
PHOSPHATE SERPL-MCNC: 3.6 MG/DL — SIGNIFICANT CHANGE UP (ref 2.5–4.5)
PLATELET # BLD AUTO: 110 K/UL — LOW (ref 150–400)
POTASSIUM SERPL-MCNC: 4.3 MMOL/L — SIGNIFICANT CHANGE UP (ref 3.5–5.3)
POTASSIUM SERPL-SCNC: 4.3 MMOL/L — SIGNIFICANT CHANGE UP (ref 3.5–5.3)
RBC # BLD: 3.67 M/UL — LOW (ref 4.2–5.8)
RBC # FLD: 15.5 % — HIGH (ref 10.3–14.5)
SODIUM SERPL-SCNC: 140 MMOL/L — SIGNIFICANT CHANGE UP (ref 135–145)
SURGICAL PATHOLOGY STUDY: SIGNIFICANT CHANGE UP
WBC # BLD: 4.5 K/UL — SIGNIFICANT CHANGE UP (ref 3.8–10.5)
WBC # FLD AUTO: 4.5 K/UL — SIGNIFICANT CHANGE UP (ref 3.8–10.5)

## 2020-01-10 RX ORDER — DIPHENHYDRAMINE HCL 50 MG
25 CAPSULE ORAL AT BEDTIME
Refills: 0 | Status: COMPLETED | OUTPATIENT
Start: 2020-01-10 | End: 2020-01-10

## 2020-01-10 RX ORDER — DEXTROSE MONOHYDRATE, SODIUM CHLORIDE, AND POTASSIUM CHLORIDE 50; .745; 4.5 G/1000ML; G/1000ML; G/1000ML
1000 INJECTION, SOLUTION INTRAVENOUS
Refills: 0 | Status: DISCONTINUED | OUTPATIENT
Start: 2020-01-10 | End: 2020-01-10

## 2020-01-10 RX ORDER — DEXTROSE MONOHYDRATE, SODIUM CHLORIDE, AND POTASSIUM CHLORIDE 50; .745; 4.5 G/1000ML; G/1000ML; G/1000ML
1000 INJECTION, SOLUTION INTRAVENOUS
Refills: 0 | Status: DISCONTINUED | OUTPATIENT
Start: 2020-01-10 | End: 2020-01-11

## 2020-01-10 RX ADMIN — Medication 975 MILLIGRAM(S): at 05:30

## 2020-01-10 RX ADMIN — Medication 975 MILLIGRAM(S): at 11:19

## 2020-01-10 RX ADMIN — Medication 975 MILLIGRAM(S): at 17:15

## 2020-01-10 RX ADMIN — Medication 975 MILLIGRAM(S): at 12:30

## 2020-01-10 RX ADMIN — HEPARIN SODIUM 5000 UNIT(S): 5000 INJECTION INTRAVENOUS; SUBCUTANEOUS at 22:00

## 2020-01-10 RX ADMIN — OXYCODONE HYDROCHLORIDE 5 MILLIGRAM(S): 5 TABLET ORAL at 21:23

## 2020-01-10 RX ADMIN — Medication 25 MILLIGRAM(S): at 21:22

## 2020-01-10 RX ADMIN — HEPARIN SODIUM 5000 UNIT(S): 5000 INJECTION INTRAVENOUS; SUBCUTANEOUS at 14:37

## 2020-01-10 RX ADMIN — Medication 975 MILLIGRAM(S): at 04:54

## 2020-01-10 RX ADMIN — OXYCODONE HYDROCHLORIDE 5 MILLIGRAM(S): 5 TABLET ORAL at 22:23

## 2020-01-10 RX ADMIN — HEPARIN SODIUM 5000 UNIT(S): 5000 INJECTION INTRAVENOUS; SUBCUTANEOUS at 05:00

## 2020-01-10 NOTE — DIETITIAN INITIAL EVALUATION ADULT. - ENERGY NEEDS
Height: 69.5" Weight: 199.4lbs, IBW 163lbs+/-10%, %.3%, BMI 29.0 kg/m2  IBW used for calculations as pt >120% of IBW. Needs adjusted for post-op, and advanced age.

## 2020-01-10 NOTE — PROGRESS NOTE ADULT - SUBJECTIVE AND OBJECTIVE BOX
POD 3  Passing flatus x 8-10 times since midnight but is distended.  No N or V.    Ambulating and voiding well.  Taking 1/3 small cup of clears / hour and chewing gum.  Shifiting positions (supine to L and R decubitis, then trendelenberg, the reverse T)  Vital Signs Last 24 Hrs  T(C): 36.8 (10 Hai 2020 16:07), Max: 37.2 (10 Hai 2020 05:23)  T(F): 98.3 (10 Hai 2020 16:07), Max: 99 (10 Hai 2020 05:23)  HR: 66 (10 Hai 2020 16:07) (66 - 83)  BP: 147/90 (10 Hai 2020 16:07) (131/70 - 147/90)  BP(mean): --  RR: 18 (10 Hai 2020 16:07) (17 - 18)  SpO2: 96% (10 Hai 2020 16:07) (95% - 99%)    lungs clear  cor: S1S2  abd: softly distended, BS + normal pitched, hyperactive, incision intact, penroses in place  digital: air in rectum (able to pass small amount with finger stenting the anus)  ext: without calf tenderness     ml/shift  IV had been 75 ml/hr but recently increased to 100 ml/hr                          10.3   4.50  )-----------( 110      ( 10 Hai 2020 08:28 )             32.2   01-10    140  |  106  |  9   ----------------------------<  108<H>  4.3   |  24  |  1.07    Ca    8.4      10 Hai 2020 08:28  Phos  3.6     01-10  Mg     2.0     01-10

## 2020-01-10 NOTE — PROGRESS NOTE ADULT - ASSESSMENT
64M with long history of numerous episodes of diverticulitis now s/p laparoscopic sigmoidectomy 1/7    - CLD/IVF  - Pain/nausea control  - OOBA  - SCDs/SQH  - AM labs  - Continue to monitor for return of bowel function

## 2020-01-10 NOTE — DIETITIAN INITIAL EVALUATION ADULT. - ADD RECOMMEND
1. CLD 2. Recommend advance to low fiber diet when medically feasible. 3. Recommend start bowel regime per persistent constipation s/p surgery 4. RD diet reenforcement prn

## 2020-01-10 NOTE — PROGRESS NOTE ADULT - ASSESSMENT
A/P  Doing ok but is distended and passing small amounts of flatus.  AXR shows air filled colon proximal to mosis.  Passing small amounts of flatus and is Afebrile with VSS and normal WBC and no tenderness.  When supine and flat abdomen not that distended and very soft.  Suspect edema at the anastomosis from surgery and excision of many large epiploicae to allow antimesenteric aspects of the bowel to meet.   Decrease IV fluids to 50 /hr (UO excellent).  Benadryl for sleep.    follow exam.  Allow limited clears.

## 2020-01-10 NOTE — DIETITIAN INITIAL EVALUATION ADULT. - OTHER INFO
64M with hx of carcinoid tumor resected from left lung and longstanding hx of recurrent diverticulitis admitting for elective sigmoidectomy (1/7), now POD #3. On assessment, pt resting in bed with wife at bedside. Pt diet was advanced to LFD 1/9, but c/o increased bloating and diet was changed back to CLD. Passed TOV. Currently on CLD tolerating PO denying N,V but c/o worsening distention limiting overall appetite. Education provided on diet progression, and low fiber diet guidelines and reasons for diet- pt and wife appear receptive to education. Educational handout left at bedside. Noted good appetite PTA, no changes in UBW. Follows regular diet. NKFA. GI: WDL, distention, +partial F per pt, awaiting BM. Skin: surgical incision, courtney score 20. Pain: none noted at this time. Please see nutrition recommendations below. RD to follow up per protocol.

## 2020-01-11 LAB
ANION GAP SERPL CALC-SCNC: 13 MMOL/L — SIGNIFICANT CHANGE UP (ref 5–17)
BUN SERPL-MCNC: 8 MG/DL — SIGNIFICANT CHANGE UP (ref 7–23)
CALCIUM SERPL-MCNC: 8.7 MG/DL — SIGNIFICANT CHANGE UP (ref 8.4–10.5)
CHLORIDE SERPL-SCNC: 106 MMOL/L — SIGNIFICANT CHANGE UP (ref 96–108)
CO2 SERPL-SCNC: 23 MMOL/L — SIGNIFICANT CHANGE UP (ref 22–31)
CREAT SERPL-MCNC: 1.01 MG/DL — SIGNIFICANT CHANGE UP (ref 0.5–1.3)
GLUCOSE SERPL-MCNC: 97 MG/DL — SIGNIFICANT CHANGE UP (ref 70–99)
MAGNESIUM SERPL-MCNC: 1.8 MG/DL — SIGNIFICANT CHANGE UP (ref 1.6–2.6)
PHOSPHATE SERPL-MCNC: 3.7 MG/DL — SIGNIFICANT CHANGE UP (ref 2.5–4.5)
POTASSIUM SERPL-MCNC: 4.2 MMOL/L — SIGNIFICANT CHANGE UP (ref 3.5–5.3)
POTASSIUM SERPL-SCNC: 4.2 MMOL/L — SIGNIFICANT CHANGE UP (ref 3.5–5.3)
SODIUM SERPL-SCNC: 142 MMOL/L — SIGNIFICANT CHANGE UP (ref 135–145)

## 2020-01-11 RX ORDER — ACETAMINOPHEN 500 MG
650 TABLET ORAL EVERY 6 HOURS
Refills: 0 | Status: DISCONTINUED | OUTPATIENT
Start: 2020-01-11 | End: 2020-01-12

## 2020-01-11 RX ORDER — DIPHENHYDRAMINE HCL 50 MG
25 CAPSULE ORAL ONCE
Refills: 0 | Status: COMPLETED | OUTPATIENT
Start: 2020-01-11 | End: 2020-01-11

## 2020-01-11 RX ORDER — MAGNESIUM SULFATE 500 MG/ML
1 VIAL (ML) INJECTION ONCE
Refills: 0 | Status: COMPLETED | OUTPATIENT
Start: 2020-01-11 | End: 2020-01-11

## 2020-01-11 RX ADMIN — Medication 100 GRAM(S): at 11:41

## 2020-01-11 RX ADMIN — HEPARIN SODIUM 5000 UNIT(S): 5000 INJECTION INTRAVENOUS; SUBCUTANEOUS at 21:44

## 2020-01-11 RX ADMIN — Medication 650 MILLIGRAM(S): at 21:44

## 2020-01-11 RX ADMIN — HEPARIN SODIUM 5000 UNIT(S): 5000 INJECTION INTRAVENOUS; SUBCUTANEOUS at 06:00

## 2020-01-11 RX ADMIN — HEPARIN SODIUM 5000 UNIT(S): 5000 INJECTION INTRAVENOUS; SUBCUTANEOUS at 14:51

## 2020-01-11 RX ADMIN — Medication 25 MILLIGRAM(S): at 21:44

## 2020-01-11 RX ADMIN — Medication 650 MILLIGRAM(S): at 22:50

## 2020-01-11 NOTE — PROGRESS NOTE ADULT - ASSESSMENT
A/P  Doing better.  Unlimited full liquids this AM.  IF tolerates then low residue diet for dinner.  If has increased distension then will back off po intake.  Increase ambulation and body position shifting.

## 2020-01-11 NOTE — PROGRESS NOTE ADULT - ASSESSMENT
64M with long history of numerous episodes of diverticulitis now s/p laparoscopic sigmoidectomy 1/7    - CLD/IVF  - Pain/nausea control  - SCDs/SQH  - AM labs

## 2020-01-11 NOTE — PROGRESS NOTE ADULT - SUBJECTIVE AND OBJECTIVE BOX
INTERVAL HPI/OVERNIGHT EVENTS: Overnight pt did not take in much PO. He did have two episodes of small BM's and is continuing to pass flatus. This AM pt feels he is less bloated. Denies any n/v/cp/sob    STATUS POST:  laparoscopic sigmoidectomy    POST OPERATIVE DAY #: 4    MEDICATIONS  (STANDING):  artificial tears (preservative free) Ophthalmic Solution 1 Drop(s) Both EYES three times a day  dextrose 5% + sodium chloride 0.45% with potassium chloride 20 mEq/L 1000 milliLiter(s) (50 mL/Hr) IV Continuous <Continuous>  heparin  Injectable 5000 Unit(s) SubCutaneous every 8 hours    MEDICATIONS  (PRN):  ondansetron Injectable 4 milliGRAM(s) IV Push every 6 hours PRN Nausea  oxyCODONE    IR 5 milliGRAM(s) Oral every 6 hours PRN Severe Pain (7 - 10)      Vital Signs Last 24 Hrs  T(C): 36.8 (11 Jan 2020 08:01), Max: 37.3 (11 Jan 2020 05:23)  T(F): 98.2 (11 Jan 2020 08:01), Max: 99.1 (11 Jan 2020 05:23)  HR: 66 (11 Jan 2020 08:01) (66 - 71)  BP: 136/87 (11 Jan 2020 08:01) (136/87 - 152/87)  BP(mean): --  RR: 16 (11 Jan 2020 08:01) (16 - 18)  SpO2: 95% (11 Jan 2020 08:01) (95% - 96%)    PHYSICAL EXAM:      Constitutional: A&Ox3    Respiratory: non labored breathing, no respiratory distress    Cardiovascular: NSR, RRR    Gastrointestinal: soft, non-tender, moderately distended                 Incision: c/d/i    Extremities: (-) edema                  I&O's Detail    10 Hai 2020 07:01  -  11 Jan 2020 07:00  --------------------------------------------------------  IN:    dextrose 5% + sodium chloride 0.45% with potassium chloride 20 mEq/L: 600 mL    Oral Fluid: 485 mL  Total IN: 1085 mL    OUT:    Voided: 1050 mL  Total OUT: 1050 mL    Total NET: 35 mL      11 Jan 2020 07:01  -  11 Jan 2020 09:54  --------------------------------------------------------  IN:    dextrose 5% + sodium chloride 0.45% with potassium chloride 20 mEq/L: 50 mL  Total IN: 50 mL    OUT:  Total OUT: 0 mL    Total NET: 50 mL          LABS:                        10.3   4.50  )-----------( 110      ( 10 Hai 2020 08:28 )             32.2     01-11    142  |  106  |  8   ----------------------------<  97  4.2   |  23  |  1.01    Ca    8.7      11 Jan 2020 07:25  Phos  3.7     01-11  Mg     1.8     01-11            RADIOLOGY & ADDITIONAL STUDIES:

## 2020-01-11 NOTE — PROGRESS NOTE ADULT - SUBJECTIVE AND OBJECTIVE BOX
POD 4  Passing little more flatus and also had  small liquid BM's.  Tolerating limited clear liquids.  Took oxycodone x 1 last PM.  None today, thus far.  Ambulating and shifting his body positions.  voiding ok.    Vital Signs Last 24 Hrs  T(C): 36.8 (11 Jan 2020 08:01), Max: 37.3 (11 Jan 2020 05:23)  T(F): 98.2 (11 Jan 2020 08:01), Max: 99.1 (11 Jan 2020 05:23)  HR: 66 (11 Jan 2020 08:01) (66 - 71)  BP: 136/87 (11 Jan 2020 08:01) (136/87 - 152/87)  BP(mean): --  RR: 16 (11 Jan 2020 08:01) (16 - 18)  SpO2: 95% (11 Jan 2020 08:01) (95% - 96%)    lungs clear  cor SS2  abd: softly distended, non tender, BS+, incision intact  ext: without calf tenderness	    UO: adequate    IV 50 ml/hr                          10.3   4.50  )-----------( 110      ( 10 Hai 2020 08:28 )             32.2   01-11    142  |  106  |  8   ----------------------------<  97  4.2   |  23  |  1.01    Ca    8.7      11 Jan 2020 07:25  Phos  3.7     01-11  Mg     1.8     01-11

## 2020-01-12 ENCOUNTER — TRANSCRIPTION ENCOUNTER (OUTPATIENT)
Age: 65
End: 2020-01-12

## 2020-01-12 VITALS
TEMPERATURE: 98 F | HEART RATE: 71 BPM | OXYGEN SATURATION: 97 % | RESPIRATION RATE: 16 BRPM | DIASTOLIC BLOOD PRESSURE: 87 MMHG | SYSTOLIC BLOOD PRESSURE: 156 MMHG

## 2020-01-12 LAB
ANION GAP SERPL CALC-SCNC: 12 MMOL/L — SIGNIFICANT CHANGE UP (ref 5–17)
BUN SERPL-MCNC: 8 MG/DL — SIGNIFICANT CHANGE UP (ref 7–23)
CALCIUM SERPL-MCNC: 8.9 MG/DL — SIGNIFICANT CHANGE UP (ref 8.4–10.5)
CHLORIDE SERPL-SCNC: 107 MMOL/L — SIGNIFICANT CHANGE UP (ref 96–108)
CO2 SERPL-SCNC: 24 MMOL/L — SIGNIFICANT CHANGE UP (ref 22–31)
CREAT SERPL-MCNC: 1.08 MG/DL — SIGNIFICANT CHANGE UP (ref 0.5–1.3)
GLUCOSE SERPL-MCNC: 101 MG/DL — HIGH (ref 70–99)
MAGNESIUM SERPL-MCNC: 2 MG/DL — SIGNIFICANT CHANGE UP (ref 1.6–2.6)
PHOSPHATE SERPL-MCNC: 4.2 MG/DL — SIGNIFICANT CHANGE UP (ref 2.5–4.5)
POTASSIUM SERPL-MCNC: 4.3 MMOL/L — SIGNIFICANT CHANGE UP (ref 3.5–5.3)
POTASSIUM SERPL-SCNC: 4.3 MMOL/L — SIGNIFICANT CHANGE UP (ref 3.5–5.3)
SODIUM SERPL-SCNC: 143 MMOL/L — SIGNIFICANT CHANGE UP (ref 135–145)

## 2020-01-12 RX ORDER — DOCUSATE SODIUM 100 MG
1 CAPSULE ORAL
Qty: 60 | Refills: 0
Start: 2020-01-12 | End: 2020-02-10

## 2020-01-12 RX ADMIN — HEPARIN SODIUM 5000 UNIT(S): 5000 INJECTION INTRAVENOUS; SUBCUTANEOUS at 05:57

## 2020-01-12 NOTE — PROGRESS NOTE ADULT - SUBJECTIVE AND OBJECTIVE BOX
STATUS POST:  POD # 5 s/p laparoscopic sigmoidectomy    INTERVAL HPI/OVERNIGHT EVENTS: Advanced to FLD , tolerating. Fluids dc'd     SUBJECTIVE: Examined with chief resident at the bedside, resting comfortably. This morning, he feels well; his pain is well-controlled. No nausea or vomiting. Passing flatus. No acute complaints.     heparin  Injectable 5000 Unit(s) SubCutaneous every 8 hours      Vital Signs Last 24 Hrs  T(C): 37.3 (12 Jan 2020 05:52), Max: 37.3 (12 Jan 2020 05:52)  T(F): 99.2 (12 Jan 2020 05:52), Max: 99.2 (12 Jan 2020 05:52)  HR: 64 (12 Jan 2020 05:52) (64 - 75)  BP: 146/83 (12 Jan 2020 05:52) (136/81 - 146/83)  BP(mean): 98 (11 Jan 2020 20:43) (98 - 98)  RR: 17 (12 Jan 2020 05:52) (17 - 18)  SpO2: 94% (12 Jan 2020 05:52) (94% - 96%)  I&O's Detail    11 Jan 2020 07:01  -  12 Jan 2020 07:00  --------------------------------------------------------  IN:    dextrose 5% + sodium chloride 0.45% with potassium chloride 20 mEq/L: 650 mL    Oral Fluid: 660 mL  Total IN: 1310 mL    OUT:    Voided: 1750 mL  Total OUT: 1750 mL    Total NET: -440 mL          Physical Exam:  General: NAD, resting comfortably in bed  Pulmonary: Nonlabored breathing, no respiratory distress  Cardiovascular: NSR  Abdominal: soft, non-tender, non-distended, midline incision c/d/i, Penrose removed,   Extremities: WWP, normal strength        LABS:                        10.3   4.50  )-----------( 110      ( 10 Hai 2020 08:28 )             32.2     01-12    143  |  107  |  8   ----------------------------<  101<H>  4.3   |  24  |  1.08    Ca    8.9      12 Jan 2020 06:27  Phos  4.2     01-12  Mg     2.0     01-12            RADIOLOGY & ADDITIONAL STUDIES:

## 2020-01-12 NOTE — DISCHARGE NOTE NURSING/CASE MANAGEMENT/SOCIAL WORK - NSPROEXTENSIONSOFSELF_GEN_A_NUR
eyeglasses/none
Pt. reports he lives with his wife and daughter in a house with 1 step to enter. Once inside, pt. reports he has a full flight of steps to negotiate to reach 2nd floor where main bedroom and bathroom are located. Per pt., he has a bathtub in his bathroom with shower chair available.

## 2020-01-12 NOTE — PROGRESS NOTE ADULT - ASSESSMENT
A/P doing well  low residue diet  d/c penrose drains  D/c home today  DPO f/u in 1 week  Call for problems

## 2020-01-12 NOTE — PROGRESS NOTE ADULT - ASSESSMENT
64M with long history of numerous episodes of diverticulitis now s/p laparoscopic sigmoidectomy 1/7. Clinically improving. Discharge.    - LRD/IVF  - Pain/nausea control  - SCDs/SQH  - AM labs  -Discharge

## 2020-01-12 NOTE — DISCHARGE NOTE NURSING/CASE MANAGEMENT/SOCIAL WORK - PATIENT PORTAL LINK FT
You can access the FollowMyHealth Patient Portal offered by [facility] by registering at the following website: [email or web address]. By joining [facility]’s FollowMyHealth portal, you will also be able to view your health information using other applications (apps) compatible with our system.

## 2020-01-12 NOTE — PROGRESS NOTE ADULT - REASON FOR ADMISSION
Diverticulitis

## 2020-01-12 NOTE — PROGRESS NOTE ADULT - SUBJECTIVE AND OBJECTIVE BOX
POD 5  + BM and flatus  Feels better  Full liquids yesterday  Ambulating  Wants d/c  Voiding well    Vital Signs Last 24 Hrs  T(C): 37.3 (12 Jan 2020 05:52), Max: 37.3 (12 Jan 2020 05:52)  T(F): 99.2 (12 Jan 2020 05:52), Max: 99.2 (12 Jan 2020 05:52)  HR: 64 (12 Jan 2020 05:52) (64 - 75)  BP: 146/83 (12 Jan 2020 05:52) (136/81 - 146/83)  BP(mean): 98 (11 Jan 2020 20:43) (98 - 98)  RR: 17 (12 Jan 2020 05:52) (16 - 18)  SpO2: 94% (12 Jan 2020 05:52) (94% - 96%)  lungs clear  cor S1S2  abd: benign, incision intact, minus d/c, non tender  ext: without calf tenderness    UO adequate

## 2020-01-13 PROBLEM — I10 ESSENTIAL (PRIMARY) HYPERTENSION: Chronic | Status: ACTIVE | Noted: 2020-01-06

## 2020-01-13 PROBLEM — K57.92 DIVERTICULITIS OF INTESTINE, PART UNSPECIFIED, WITHOUT PERFORATION OR ABSCESS WITHOUT BLEEDING: Chronic | Status: ACTIVE | Noted: 2020-01-06

## 2020-01-13 PROBLEM — K21.9 GASTRO-ESOPHAGEAL REFLUX DISEASE WITHOUT ESOPHAGITIS: Chronic | Status: ACTIVE | Noted: 2020-01-06

## 2020-01-13 PROBLEM — D3A.00 BENIGN CARCINOID TUMOR OF UNSPECIFIED SITE: Chronic | Status: ACTIVE | Noted: 2020-01-06

## 2020-01-13 PROBLEM — E78.5 HYPERLIPIDEMIA, UNSPECIFIED: Chronic | Status: ACTIVE | Noted: 2020-01-06

## 2020-01-13 PROCEDURE — 84100 ASSAY OF PHOSPHORUS: CPT

## 2020-01-13 PROCEDURE — 80048 BASIC METABOLIC PNL TOTAL CA: CPT

## 2020-01-13 PROCEDURE — 74019 RADEX ABDOMEN 2 VIEWS: CPT

## 2020-01-13 PROCEDURE — 86901 BLOOD TYPING SEROLOGIC RH(D): CPT

## 2020-01-13 PROCEDURE — 86803 HEPATITIS C AB TEST: CPT

## 2020-01-13 PROCEDURE — 85027 COMPLETE CBC AUTOMATED: CPT

## 2020-01-13 PROCEDURE — C9399: CPT

## 2020-01-13 PROCEDURE — C1889: CPT

## 2020-01-13 PROCEDURE — 36415 COLL VENOUS BLD VENIPUNCTURE: CPT

## 2020-01-13 PROCEDURE — 86850 RBC ANTIBODY SCREEN: CPT

## 2020-01-13 PROCEDURE — 82962 GLUCOSE BLOOD TEST: CPT

## 2020-01-13 PROCEDURE — 88307 TISSUE EXAM BY PATHOLOGIST: CPT

## 2020-01-13 PROCEDURE — 83735 ASSAY OF MAGNESIUM: CPT

## 2020-01-17 ENCOUNTER — APPOINTMENT (OUTPATIENT)
Dept: COLORECTAL SURGERY | Facility: CLINIC | Age: 65
End: 2020-01-17

## 2020-01-17 VITALS
DIASTOLIC BLOOD PRESSURE: 67 MMHG | HEART RATE: 79 BPM | TEMPERATURE: 98.7 F | BODY MASS INDEX: 28.73 KG/M2 | HEIGHT: 69 IN | WEIGHT: 194 LBS | SYSTOLIC BLOOD PRESSURE: 106 MMHG

## 2020-01-17 NOTE — HISTORY OF PRESENT ILLNESS
[FreeTextEntry1] : s/p sigmoid resection for diverticulitis on January 7, 2019. Doing well, no pain, no nausea or vomiting, no fevers.  BM's are  soft, sometimes multiple,  feels is incompletely evacuating.  \par Eating a low residue diet.

## 2020-01-21 DIAGNOSIS — K57.32 DIVERTICULITIS OF LARGE INTESTINE WITHOUT PERFORATION OR ABSCESS WITHOUT BLEEDING: ICD-10-CM

## 2020-01-21 DIAGNOSIS — E78.5 HYPERLIPIDEMIA, UNSPECIFIED: ICD-10-CM

## 2020-01-21 DIAGNOSIS — Z98.41 CATARACT EXTRACTION STATUS, RIGHT EYE: ICD-10-CM

## 2020-01-21 DIAGNOSIS — Z99.89 DEPENDENCE ON OTHER ENABLING MACHINES AND DEVICES: ICD-10-CM

## 2020-01-21 DIAGNOSIS — I10 ESSENTIAL (PRIMARY) HYPERTENSION: ICD-10-CM

## 2020-01-21 DIAGNOSIS — Z79.82 LONG TERM (CURRENT) USE OF ASPIRIN: ICD-10-CM

## 2020-01-21 DIAGNOSIS — G47.33 OBSTRUCTIVE SLEEP APNEA (ADULT) (PEDIATRIC): ICD-10-CM

## 2020-01-21 DIAGNOSIS — Z98.42 CATARACT EXTRACTION STATUS, LEFT EYE: ICD-10-CM

## 2020-02-10 ENCOUNTER — APPOINTMENT (OUTPATIENT)
Dept: COLORECTAL SURGERY | Facility: CLINIC | Age: 65
End: 2020-02-10
Payer: MEDICARE

## 2020-02-10 VITALS
TEMPERATURE: 98.4 F | HEIGHT: 69 IN | WEIGHT: 162.38 LBS | DIASTOLIC BLOOD PRESSURE: 55 MMHG | HEART RATE: 85 BPM | SYSTOLIC BLOOD PRESSURE: 115 MMHG | BODY MASS INDEX: 24.05 KG/M2

## 2020-02-10 DIAGNOSIS — Z98.890 OTHER SPECIFIED POSTPROCEDURAL STATES: ICD-10-CM

## 2020-02-10 PROCEDURE — 99024 POSTOP FOLLOW-UP VISIT: CPT

## 2020-02-10 NOTE — HISTORY OF PRESENT ILLNESS
[FreeTextEntry1] : 5 weeks s/p sigmoid resection for diveritculitis.  NO fever.  Having 4 or more BM's per day.\par Sensation of incomplete evacuation.  NO N/V.  Voiding normally.  \par Ambulating well.\par Has been on low residue diet (no salads or raw fruit and vegetables).\par \par Pathology shows diverticulitis.\par

## 2020-02-10 NOTE — ASSESSMENT
[FreeTextEntry1] : Doing well postop except for frequent BM's (on hard side) and sensation of incomplete evacuation.\par Otherwise fine.\par Has been on low residue diet.  Will liberalize now so that he can have fruits and vegetables (raw) and he will also add fiber supplement (which he had been on preop).\par He is also taking colace.\par He has not been drinking much water.\par Flex sig in 3-4 months.

## 2020-02-10 NOTE — PHYSICAL EXAM
[Abdomen Masses] : No abdominal masses [Abdomen Tenderness] : ~T No ~M abdominal tenderness [Exam Deferred] : exam was deferred [de-identified] : well healed periumbilical scar and other port scars as well. No hernias. Milldly obese.  no masses or tenderness

## 2020-05-01 ENCOUNTER — APPOINTMENT (OUTPATIENT)
Dept: COLORECTAL SURGERY | Facility: CLINIC | Age: 65
End: 2020-05-01
Payer: MEDICARE

## 2020-05-01 PROCEDURE — G2012 BRIEF CHECK IN BY MD/QHP: CPT

## 2020-08-12 ENCOUNTER — APPOINTMENT (OUTPATIENT)
Dept: COLORECTAL SURGERY | Facility: AMBULATORY SURGERY CENTER | Age: 65
End: 2020-08-12
Payer: MEDICARE

## 2020-08-12 PROCEDURE — 45331 SIGMOIDOSCOPY AND BIOPSY: CPT

## 2022-02-04 ENCOUNTER — TRANSCRIPTION ENCOUNTER (OUTPATIENT)
Age: 67
End: 2022-02-04

## 2022-08-08 ENCOUNTER — NON-APPOINTMENT (OUTPATIENT)
Age: 67
End: 2022-08-08

## 2022-09-19 ENCOUNTER — TRANSCRIPTION ENCOUNTER (OUTPATIENT)
Age: 67
End: 2022-09-19

## 2022-10-13 ENCOUNTER — NON-APPOINTMENT (OUTPATIENT)
Age: 67
End: 2022-10-13

## 2022-10-18 ENCOUNTER — APPOINTMENT (OUTPATIENT)
Dept: SURGERY | Facility: CLINIC | Age: 67
End: 2022-10-18

## 2022-10-18 VITALS
WEIGHT: 197 LBS | HEIGHT: 69 IN | DIASTOLIC BLOOD PRESSURE: 90 MMHG | TEMPERATURE: 98.9 F | RESPIRATION RATE: 18 BRPM | SYSTOLIC BLOOD PRESSURE: 149 MMHG | BODY MASS INDEX: 29.18 KG/M2 | HEART RATE: 80 BPM

## 2022-10-18 DIAGNOSIS — M10.9 GOUT, UNSPECIFIED: ICD-10-CM

## 2022-10-18 DIAGNOSIS — T14.8XXA OTHER INJURY OF UNSPECIFIED BODY REGION, INITIAL ENCOUNTER: ICD-10-CM

## 2022-10-18 DIAGNOSIS — K42.9 UMBILICAL HERNIA W/OUT OBSTRUCTION OR GANGRENE: ICD-10-CM

## 2022-10-18 PROCEDURE — 99203 OFFICE O/P NEW LOW 30 MIN: CPT

## 2022-10-18 RX ORDER — TAMSULOSIN HYDROCHLORIDE 0.4 MG/1
0.4 CAPSULE ORAL
Refills: 0 | Status: ACTIVE | COMMUNITY

## 2022-10-18 NOTE — PHYSICAL EXAM
[Normal Breath Sounds] : Normal breath sounds [Normal Heart Sounds] : normal heart sounds [No Rash or Lesion] : No rash or lesion [Alert] : alert [Oriented to Person] : oriented to person [Oriented to Place] : oriented to place [Oriented to Time] : oriented to time [Calm] : calm [de-identified] : NAD [de-identified] : soft, benign, small asx umbilical hernia, no inguinal hernias palpated

## 2022-10-18 NOTE — ASSESSMENT
[FreeTextEntry1] : reassured, no surgery indicated unless new bulges develop or the umbilical hernia enlarges.

## 2022-10-18 NOTE — HISTORY OF PRESENT ILLNESS
[de-identified] : The patient is referred by Dr Knox for evaluation and discussion regarding right groin soreness noted intermittently over the past few months. He is s/p a laparoscopic right inguinal hernia repair with mesh and open umbilical hernia 4 yrs ago at [facility]. He has been experiencing some bilateral groin soreness and some lower right buttock pain radiating to his hamstring on the right. He has recurrent episode of gout as well. He feels he has general "inflammation" and wanted to be sure there is not a recurrent hernia on the right. He does have a small bulge at the umbilicus which is not sx.

## 2022-12-04 ENCOUNTER — NON-APPOINTMENT (OUTPATIENT)
Age: 67
End: 2022-12-04

## 2023-08-22 ENCOUNTER — TRANSCRIPTION ENCOUNTER (OUTPATIENT)
Age: 68
End: 2023-08-22

## 2025-05-30 ENCOUNTER — NON-APPOINTMENT (OUTPATIENT)
Age: 70
End: 2025-05-30